# Patient Record
Sex: FEMALE | Race: OTHER
[De-identification: names, ages, dates, MRNs, and addresses within clinical notes are randomized per-mention and may not be internally consistent; named-entity substitution may affect disease eponyms.]

---

## 2018-12-01 ENCOUNTER — HOSPITAL ENCOUNTER (OUTPATIENT)
Dept: HOSPITAL 80 - FLD | Age: 23
Setting detail: OBSERVATION
Discharge: HOME | End: 2018-12-01
Attending: ADVANCED PRACTICE MIDWIFE | Admitting: ADVANCED PRACTICE MIDWIFE
Payer: MEDICAID

## 2018-12-01 DIAGNOSIS — O26.892: Primary | ICD-10-CM

## 2018-12-01 DIAGNOSIS — Z3A.20: ICD-10-CM

## 2018-12-01 DIAGNOSIS — R10.2: ICD-10-CM

## 2018-12-01 NOTE — PDGENHP
History and Physical


History and Physical: 


Prenatal Care: [facility]





HPI: 


Vanita is a 21yo  with IUP@ 20 weeks that presents to L&D with complaints 

of lower abdominal pain worse with walking. She also reports vaginal/groin 

pain. She states the pain is improved with rest. She denies any dysuria/urgency/

frequency. She reports +FM. She denies any contractions/LOF/VB. 


EDC: 19 which is based on LMP which is known and consistent with 

Ultrasound @ 12wks (per pt).


Her pregnancy is complicated by: none (per pt)





Review of Systems:


Constitutional: Denies any fever, chills, or fatigue


HEENT: denies any visual changes, difficulty swallowing, hearing loss


Cardiovascular: Denies any chest pain, palpitations, leg swelling


Respiratory: denies any cough, wheezing, or shortness of breathe


GI: Denies any nausea, vomiting, diarrhea, constipation, reports +lower 

abdominal pain


: denies any dysuria, urgency, frequency, vaginal bleeding


Musculoskeletal: denies any muscle or bone pain


Skin: denies any rashes


Neuro: denies any headache, seizures, lightheadedness, dizziness, or loss of 

consciousness


Psychiatric: denies any depression, anxiety, or SI/HI thoughts





HISTORY: 


Previous OB history:   2013 6#3, EAB x1


Past medical history:  denies any


Past surgical history: denies


Social: Denies any alcohol, tobacco, or drug use.


Family history: Not relevant


Medications: PNV


Allergies (list reaction): NKDA





PRENATAL LABS:


unknown 








PHYSICAL EXAM:


Constitutional: WN, A&Ox3


HEENT: normocephalic atraumatic, supple


Skin: Warm, dry, intact


Heart: RRR, no murmur


Chest: CTA-B


back: Neg CVA tenderness


Abdomen: Soft, nontender, gravid


SVE: deferred


Extremities: no edema, negative homans sign


Neuro: grossly normal


Psych: normal affect





Fetal assessment:  


Contractions: toco none





Assessment: 


1) 23 yo  with IUP@ 20wks


2) no evidence of labor


3) round ligament pain/normal pregnancy discomforts


4) +FHT's








Plan: 


1) d/c home at this time


2) encouraged increase PO fluids


3) abdominal binder/maternity belt given


4) urine cx sent- will tx if indicated


5) keep next sched appt [facility]


6) warning S&S discussed/aware when to call/return to L&D











Today's visit was approximately 45 min, of which >50% of visit 25 min, was 

spent face to face with pt on direct counseling/coordination of care.

## 2019-01-20 ENCOUNTER — HOSPITAL ENCOUNTER (OUTPATIENT)
Dept: HOSPITAL 80 - FLD | Age: 24
Setting detail: OBSERVATION
Discharge: HOME | End: 2019-01-20
Attending: ADVANCED PRACTICE MIDWIFE | Admitting: ADVANCED PRACTICE MIDWIFE
Payer: MEDICAID

## 2019-01-20 DIAGNOSIS — Z03.79: Primary | ICD-10-CM

## 2019-01-20 DIAGNOSIS — Z3A.27: ICD-10-CM

## 2019-01-20 LAB — PLATELET # BLD: 344 10^3/UL (ref 150–400)

## 2019-01-20 PROCEDURE — G0378 HOSPITAL OBSERVATION PER HR: HCPCS

## 2019-01-20 PROCEDURE — 59025 FETAL NON-STRESS TEST: CPT

## 2019-01-20 RX ADMIN — LOPERAMIDE HYDROCHLORIDE PRN MG: 2 CAPSULE ORAL at 04:15

## 2019-01-20 RX ADMIN — LOPERAMIDE HYDROCHLORIDE PRN MG: 2 CAPSULE ORAL at 06:46

## 2019-04-14 ENCOUNTER — HOSPITAL ENCOUNTER (OUTPATIENT)
Dept: HOSPITAL 80 - FLD | Age: 24
Setting detail: OBSERVATION
Discharge: HOME | End: 2019-04-14
Attending: ADVANCED PRACTICE MIDWIFE | Admitting: ADVANCED PRACTICE MIDWIFE
Payer: MEDICAID

## 2019-04-14 DIAGNOSIS — R10.32: ICD-10-CM

## 2019-04-14 DIAGNOSIS — O99.89: Primary | ICD-10-CM

## 2019-04-14 PROCEDURE — G0378 HOSPITAL OBSERVATION PER HR: HCPCS

## 2019-04-14 PROCEDURE — 59025 FETAL NON-STRESS TEST: CPT

## 2019-04-14 NOTE — OBGCSDC
General Delivery Information





- General Info


: 3


Para: 1


Abortions: 1


Admission Date: 19





- Hospital Course


Antepartum: 





19 19:11


pain has resolved, reactive NST at this visit.





 Birth Data


ROGELIO: 19


Gestational Age: 39 week(s) and 2 day(s)





Discharge Information





- Discharge Information


Condition: Good

## 2019-04-19 ENCOUNTER — HOSPITAL ENCOUNTER (INPATIENT)
Dept: HOSPITAL 80 - FLD | Age: 24
LOS: 2 days | Discharge: HOME | End: 2019-04-21
Attending: ADVANCED PRACTICE MIDWIFE | Admitting: ADVANCED PRACTICE MIDWIFE
Payer: MEDICAID

## 2019-04-19 DIAGNOSIS — Z3A.40: ICD-10-CM

## 2019-04-19 LAB — PLATELET # BLD: 353 10^3/UL (ref 150–400)

## 2019-04-19 RX ADMIN — IBUPROFEN PRN MG: 600 TABLET ORAL at 17:32

## 2019-04-19 RX ADMIN — IBUPROFEN PRN MG: 600 TABLET ORAL at 10:52

## 2019-04-19 RX ADMIN — ACETAMINOPHEN PRN MG: 325 TABLET ORAL at 10:52

## 2019-04-19 RX ADMIN — IBUPROFEN PRN MG: 600 TABLET ORAL at 23:14

## 2019-04-19 RX ADMIN — ACETAMINOPHEN PRN MG: 325 TABLET ORAL at 17:32

## 2019-04-19 RX ADMIN — DOCUSATE SODIUM PRN MG: 100 CAPSULE, LIQUID FILLED ORAL at 10:51

## 2019-04-19 NOTE — PDGENHP
History and Physical


History and Physical: 





PRENATAL CARE:  [facility] 








HPI:  





Patient is a 22 yo G 3  P 1  @ 40 weeks that presents to L&D with complaints of 

SROM and strong uterine contractions for past 2 hours. 





EDC: 19 which is based on LMP: 6/10/18 which is known, but inconsistent 

with Ultrasound 6 weeks. 





Her pregnancy is complicated by:  BMI>30, late PNC at 22 weeks, EIF in L 

ventricle, rubella non-immune 








Review of Systems: 





Constitutional: Denies any fever, chills, or fatigue 





HEENT: denies any visual changes, difficulty swallowing, hearing loss 





Cardiovascular: Denies any chest pain, palpitations, leg swelling 





Respiratory: denies any cough, wheezing, or shortness of breathe 





GI: Denies any nausea, vomiting, diarrhea, constipation 





: denies any dysuria, urgency, frequency, vaginal bleeding 





Musculoskeletal: denies any muscle or bone pain 





Skin: denies any rashes 





Neuro: denies any headache, seizures, lightheadedness, dizziness, or loss of 

consciousness 





Psychiatric: denies any depression, anxiety, or SI/HI thoughts 








HISTORY:  





Previous OB history:   of 6#3oz boy in , EAB 





Social history: , dental hygienist 





Family history:   non-contributory





Past medical history:  denies





Past surgical history: no





Medications: PNV 


Allergies (list reaction): NKDA 


 





PRENATAL LABS: 





Rh: O+





ABS: Neg 





Rubella: NON- Immune 





HbsAg: NR 





HIV: NR 





VDRL: NR 





1hr:  103





GC: Neg 





Chlamydia: Neg  





Pap: Needs PP 





GBS: neg 


 





BMI: (prepreg) 34





 





PHYSICAL EXAM: 





Constitutional: WN, A&Ox3 





HEENT: normocephalic atraumatic, supple 





Heart: RRR, no murmur 





Chest: CTA-B 





Skin: warm, dry, intact 





Abdomen: Soft, nontender, gravid 





SVE: 6/90/-2 





Extremities: trace edema, negative homans sign 





Neuro: grossly normal 





Psych: normal affect 


 





Fetal assessment:  FHT baseline 145, +accels, no decels, moderate variability  





Contractions: toco q 4 min 








Assessment:  





1) 22 yo G 3 P 1 with IUP @ 40 weeks 





2) SROM, spontaneous labor 





3) GBS neg 





4) Cat 1 FHR tracing 


 





Plan:  





1) Admit to L&D 





2) Anticipate

## 2019-04-19 NOTE — OBPP
PostPartum Progress Note


Assessment/Plan: 


Assessment:





[id number] 


s/p 


PPD#0


Breastfeeding








Plan:


routine pp care


lactation support


ambulate/rest


plan d/c home tomorrow








19 09:00





Subjective/Postpartum Course: 





19 13:10


Pt doing well, happy with birth. She is breastfeeding without difficulty. She 

denies any pain or heavy bleeding. she does report 3 cm clot today- but denies 

any additional clots or heavy bleeding. She is ambulating and voiding without 

difficulty.


Objective: 





 





 19 01:45 





 











Patient ABO/Rh  O POSITIVE   19  01:45    








 











Temp Pulse Resp BP Pulse Ox


 


 36.6 C   104 H  20   126/69 H   


 


 19 08:25  19 08:25  19 08:25  19 08:25   











Uterine Position/Fundal Height: Umbilicus -2, Midline


Uterine Tone: Firm

## 2019-04-20 VITALS — SYSTOLIC BLOOD PRESSURE: 136 MMHG | DIASTOLIC BLOOD PRESSURE: 74 MMHG

## 2019-04-20 RX ADMIN — DOCUSATE SODIUM PRN MG: 100 CAPSULE, LIQUID FILLED ORAL at 19:35

## 2019-04-20 RX ADMIN — IBUPROFEN PRN MG: 600 TABLET ORAL at 19:34

## 2019-04-20 RX ADMIN — ACETAMINOPHEN PRN MG: 325 TABLET ORAL at 09:25

## 2019-04-20 RX ADMIN — DOCUSATE SODIUM PRN MG: 100 CAPSULE, LIQUID FILLED ORAL at 09:25

## 2019-04-20 RX ADMIN — ACETAMINOPHEN PRN MG: 325 TABLET ORAL at 22:42

## 2019-04-20 RX ADMIN — ACETAMINOPHEN PRN MG: 325 TABLET ORAL at 16:54

## 2019-04-20 RX ADMIN — IBUPROFEN PRN MG: 600 TABLET ORAL at 13:06

## 2019-04-20 RX ADMIN — IBUPROFEN PRN MG: 600 TABLET ORAL at 05:43

## 2019-04-21 RX ADMIN — IBUPROFEN PRN MG: 600 TABLET ORAL at 01:27

## 2019-04-21 RX ADMIN — DOCUSATE SODIUM PRN MG: 100 CAPSULE, LIQUID FILLED ORAL at 08:34

## 2019-04-21 RX ADMIN — ACETAMINOPHEN PRN MG: 325 TABLET ORAL at 05:16

## 2019-04-21 RX ADMIN — IBUPROFEN PRN MG: 600 TABLET ORAL at 08:34

## 2019-04-21 NOTE — OBGCSDC
General Delivery Information





- General Info


: 3


Para: 2


Abortions: 0


Birth Type: Vaginal


L&D Analgesia/Anesthesia Type: None


Admission Date: 19


Labs: 





 











Patient ABO/Rh  O POSITIVE   19  01:45    


 


Hct  34.3 % (38.0-47.0)  L  19  01:45    














- Hospital Course


Intrapartum: 





19 04:08


pt progressed rapidly to complete after admission


Postpartum: 





19 13:10


Pt doing well, happy with birth. She is breastfeeding without difficulty. She 

denies any pain or heavy bleeding. she does report 3 cm clot today- but denies 

any additional clots or heavy bleeding. She is ambulating and voiding without 

difficulty.


19 09:22


S) Pt doing well, reports min pain and bleeding. she is ambulating and voiding 

without difficulty. She is breastfeeding. She desires discharge home today.


O) VSS, afebrile


constitutional: WNF, A&Ox3


HEENT: normocephalic, atraumatic, supple


Heart: RRR, No murmur


Chest: CTA-B


Breasts: soft, nontender, not engorged, nipples


Abdomen:  Soft, nontender


Uterus:  Firm at U-2


Lochia:  Minimal rubra


Perineum:  Intact, healing well


Extremities:  Trace edema, and negative Maki's sign


Neuro:  Grossly normal


A) 23-year-old 


S/P 


PPD#2


Breastfeeding


P)


Discharge home today


Continue breastfeeding


Pelvic rest x6wks


Discussed danger signs (infection, preeclampsia, depression, heavy bleeding, etc

)


RTO in 2/4/6 weeks





Vaginal Birth





- Delivery Provider


Delivery Physician/CNM: Adilene Ireland





- Diagnosis


Labor: Spontaneous


Rupture of Membranes Type: Spontaneous


Amniotic Fluid Color: Clear


Delivery Events: None





 Birth





-  Delivery


EBL: 200





 Birth Data


ROGELIO: 19


Gestational Age: 40 week(s) and 2 day(s)


  ** Vega


Delivery Date: 19


Delivery Time: 03:46


Sex of Infant: Female


[place] Weight (gm): 3400 kg


Apgar Score (1 Min): 8


Apgar Score (5 Min): 9

## 2022-07-01 NOTE — OBDEL
Birth Info


Birth Type: Vaginal


Presentation at Delivery: Vertex


L&D Analgesia/Anesthesia Type: Nitrous


GBS+: No





- Hospital Course


Intrapartum: 





19 04:08


pt progressed rapidly to complete after admission


Indications for Delivery: Spontaneous Labor, SROM





Vaginal Delivery





- Delivery Provider


Delivery Physician/CNM: Adilene Ireland





- Labor and Delivery


Onset of Contractions Date: 19


Onset of Contractions Time: 22:30


Onset of Contractions Type: Spontaneous


Rupture of Membranes Date: 19


Rupture of Membranes Time: 12:41


Rupture of Membranes Type: Spontaneous


Amniotic Fluid Color: Clear


Dilation Complete Date: 19


Dilation Complete Time: 03:45


Placenta Delivery Date: 19


Placenta Delivery Time: 04:00


Total Hours of Labor: 5


Vaginal Sponge Count Correct: Yes


Vaginal Needle Count Correct: Yes


Vaginal Sweep Performed: Yes


EBL: 200


Delivery Events: None





 Birth Data


ROGELIO: 19


Gestational Age: 40 week(s) and 0 day(s)


  ** Vega


Delivery Date: 19


Delivery Time: 03:46


Sex of Infant: Female


Apgar Score (1 Min): 8


Apgar Score (5 Min): 9





ICD10 Worksheet


Patient Problems: 


 Problems











Problem Status Onset


 


 (normal spontaneous vaginal delivery) Acute  


 


Abdominal pain during pregnancy in third trimester Acute  














- ICD10 Problem Qualifiers


(1)  (normal spontaneous vaginal delivery) Male